# Patient Record
Sex: FEMALE | Race: WHITE | URBAN - METROPOLITAN AREA
[De-identification: names, ages, dates, MRNs, and addresses within clinical notes are randomized per-mention and may not be internally consistent; named-entity substitution may affect disease eponyms.]

---

## 2022-10-18 ENCOUNTER — OFFICE VISIT (OUTPATIENT)
Dept: URBAN - METROPOLITAN AREA CLINIC 54 | Facility: CLINIC | Age: 87
End: 2022-10-18
Payer: MEDICARE

## 2022-10-18 ENCOUNTER — WEB ENCOUNTER (OUTPATIENT)
Dept: URBAN - METROPOLITAN AREA CLINIC 54 | Facility: CLINIC | Age: 87
End: 2022-10-18

## 2022-10-18 VITALS
DIASTOLIC BLOOD PRESSURE: 79 MMHG | HEART RATE: 56 BPM | SYSTOLIC BLOOD PRESSURE: 158 MMHG | WEIGHT: 126.8 LBS | TEMPERATURE: 97.4 F | BODY MASS INDEX: 21.65 KG/M2 | HEIGHT: 64 IN

## 2022-10-18 DIAGNOSIS — R10.13 EPIGASTRIC ABDOMINAL PAIN: ICD-10-CM

## 2022-10-18 DIAGNOSIS — R11.0 NAUSEA: ICD-10-CM

## 2022-10-18 PROCEDURE — 99203 OFFICE O/P NEW LOW 30 MIN: CPT | Performed by: STUDENT IN AN ORGANIZED HEALTH CARE EDUCATION/TRAINING PROGRAM

## 2022-10-18 RX ORDER — PANTOPRAZOLE SODIUM 40 MG/1
1 TABLET TABLET, DELAYED RELEASE ORAL ONCE A DAY
Qty: 30 | Refills: 2 | OUTPATIENT
Start: 2022-10-18

## 2022-10-18 RX ORDER — MULTIVIT-MIN/IRON/FOLIC ACID/K 18-600-40
AS DIRECTED CAPSULE ORAL
Status: ACTIVE | COMMUNITY

## 2022-10-18 RX ORDER — MAGNESIUM OXIDE/MAG AA CHELATE 300 MG
1 CAPSULE WITH A MEAL CAPSULE ORAL ONCE A DAY
Status: ACTIVE | COMMUNITY

## 2022-10-18 RX ORDER — LEVOTHYROXINE SODIUM 75 UG/1
1 TABLET IN THE MORNING ON AN EMPTY STOMACH TABLET ORAL ONCE A DAY
Status: ACTIVE | COMMUNITY

## 2022-10-18 RX ORDER — ZINC GLUCONATE 50 MG
1 TABLET TABLET ORAL ONCE A DAY
Status: ACTIVE | COMMUNITY

## 2022-10-18 NOTE — HPI-TODAY'S VISIT:
Ms. Doris Russell is a 86-year-old woman with a history of hypothyroidism and melanoma status post removal who presents today to discuss stomach pain and nausea.  Patient states has been having epigastric pain for months.  She states she cannot eat because it causes her pain and nausea.  She denies any known history of GERD or of hiatal hernia.  Her last EGD was 6 to 7 years ago.  She does not know what the results were.  She does not know what the indication was.  She does not know  who this was done by.  She is not currently on any acid suppressant.  She states that symptoms are aggravated by greasy or fatty foods. she denies any aggravation from citric foods.  She does not eat spicy foods.    She is lost approximately 10 pounds in the last 3 months.  Patient denies a history of NSAID use, Helicobacter pylori, vomiting blood, vomiting, difficulty swallowing, unintentional weight loss, early satiety.   Patient denies being on anticoagulants/antiplatelets (not including aspirin 81 mg), having a pacemaker/defibrillator, cardiac arrythmia or heart failure, recent (<3 months) MI, CVA, TIA, cardiac stent placement or angioplasty, MI, cardiac valvulopathy, history of anesthesia complications including high fever with anesthesia, hemo or peritoneal dialysis, seizure d/o, need for continuous home O2, severe pulmonary disease, significantly decrease neck range of  motion, need for assistance with walking or transferring from surface-to-surface.

## 2022-10-18 NOTE — EXAM-PHYSICAL EXAM
General: Well appearing. No acute distress. HEENT: Anicteric sclerae.  Cardiovascular: Normal heart rate, no edema Respiratory: Breathing comfortably without conversational dyspnea Abdomen:  non-distended, soft, non-tender Rectal: Deferred Skin: without visible rashes on examined areas. Musculoskeletal: No limitation to neck range of motion. Ambulates without difficulty. Can stand from sitting position without assistance. Neuro: No gross focal deficits. Alert and oriented. Psych: Appropriate mood and affect.

## 2022-10-21 ENCOUNTER — CLAIMS CREATED FROM THE CLAIM WINDOW (OUTPATIENT)
Dept: URBAN - METROPOLITAN AREA SURGERY CENTER 14 | Facility: SURGERY CENTER | Age: 87
End: 2022-10-21

## 2022-10-21 ENCOUNTER — CLAIMS CREATED FROM THE CLAIM WINDOW (OUTPATIENT)
Dept: URBAN - METROPOLITAN AREA CLINIC 4 | Facility: CLINIC | Age: 87
End: 2022-10-21
Payer: MEDICARE

## 2022-10-21 ENCOUNTER — CLAIMS CREATED FROM THE CLAIM WINDOW (OUTPATIENT)
Dept: URBAN - METROPOLITAN AREA SURGERY CENTER 14 | Facility: SURGERY CENTER | Age: 87
End: 2022-10-21
Payer: MEDICARE

## 2022-10-21 DIAGNOSIS — R11.0 AM NAUSEA: ICD-10-CM

## 2022-10-21 DIAGNOSIS — R10.13 ABDOMINAL DISCOMFORT, EPIGASTRIC: ICD-10-CM

## 2022-10-21 DIAGNOSIS — K31.89 OTHER DISEASES OF STOMACH AND DUODENUM: ICD-10-CM

## 2022-10-21 DIAGNOSIS — K31.89 ACQUIRED DEFORMITY OF DUODENUM: ICD-10-CM

## 2022-10-21 PROCEDURE — G8907 PT DOC NO EVENTS ON DISCHARG: HCPCS | Performed by: STUDENT IN AN ORGANIZED HEALTH CARE EDUCATION/TRAINING PROGRAM

## 2022-10-21 PROCEDURE — 88312 SPECIAL STAINS GROUP 1: CPT | Performed by: PATHOLOGY

## 2022-10-21 PROCEDURE — 43239 EGD BIOPSY SINGLE/MULTIPLE: CPT | Performed by: STUDENT IN AN ORGANIZED HEALTH CARE EDUCATION/TRAINING PROGRAM

## 2022-10-21 PROCEDURE — 88305 TISSUE EXAM BY PATHOLOGIST: CPT | Performed by: PATHOLOGY

## 2022-10-21 RX ORDER — MAGNESIUM OXIDE/MAG AA CHELATE 300 MG
1 CAPSULE WITH A MEAL CAPSULE ORAL ONCE A DAY
Status: ACTIVE | COMMUNITY

## 2022-10-21 RX ORDER — PANTOPRAZOLE SODIUM 40 MG/1
1 TABLET TABLET, DELAYED RELEASE ORAL ONCE A DAY
Qty: 30 | Refills: 2 | Status: ACTIVE | COMMUNITY
Start: 2022-10-18

## 2022-10-21 RX ORDER — LEVOTHYROXINE SODIUM 75 UG/1
1 TABLET IN THE MORNING ON AN EMPTY STOMACH TABLET ORAL ONCE A DAY
Status: ACTIVE | COMMUNITY

## 2022-10-21 RX ORDER — MULTIVIT-MIN/IRON/FOLIC ACID/K 18-600-40
AS DIRECTED CAPSULE ORAL
Status: ACTIVE | COMMUNITY

## 2022-10-21 RX ORDER — ZINC GLUCONATE 50 MG
1 TABLET TABLET ORAL ONCE A DAY
Status: ACTIVE | COMMUNITY

## 2022-11-23 ENCOUNTER — DASHBOARD ENCOUNTERS (OUTPATIENT)
Age: 87
End: 2022-11-23

## 2022-11-23 ENCOUNTER — OFFICE VISIT (OUTPATIENT)
Dept: URBAN - METROPOLITAN AREA CLINIC 54 | Facility: CLINIC | Age: 87
End: 2022-11-23
Payer: MEDICARE

## 2022-11-23 VITALS
HEART RATE: 57 BPM | DIASTOLIC BLOOD PRESSURE: 81 MMHG | BODY MASS INDEX: 21.65 KG/M2 | TEMPERATURE: 97.8 F | WEIGHT: 126.8 LBS | SYSTOLIC BLOOD PRESSURE: 151 MMHG | HEIGHT: 64 IN

## 2022-11-23 DIAGNOSIS — R11.0 NAUSEA: ICD-10-CM

## 2022-11-23 DIAGNOSIS — R10.13 EPIGASTRIC ABDOMINAL PAIN: ICD-10-CM

## 2022-11-23 PROCEDURE — 99243 OFF/OP CNSLTJ NEW/EST LOW 30: CPT | Performed by: STUDENT IN AN ORGANIZED HEALTH CARE EDUCATION/TRAINING PROGRAM

## 2022-11-23 RX ORDER — PANTOPRAZOLE SODIUM 40 MG/1
1 TABLET TABLET, DELAYED RELEASE ORAL ONCE A DAY
Qty: 30 | Refills: 2 | Status: ACTIVE | COMMUNITY
Start: 2022-10-18

## 2022-11-23 RX ORDER — MAGNESIUM OXIDE/MAG AA CHELATE 300 MG
1 CAPSULE WITH A MEAL CAPSULE ORAL ONCE A DAY
Status: ACTIVE | COMMUNITY

## 2022-11-23 RX ORDER — LEVOTHYROXINE SODIUM 75 UG/1
1 TABLET IN THE MORNING ON AN EMPTY STOMACH TABLET ORAL ONCE A DAY
Status: ACTIVE | COMMUNITY

## 2022-11-23 RX ORDER — MULTIVIT-MIN/IRON/FOLIC ACID/K 18-600-40
AS DIRECTED CAPSULE ORAL
Status: ACTIVE | COMMUNITY

## 2022-11-23 RX ORDER — ZINC GLUCONATE 50 MG
1 TABLET TABLET ORAL ONCE A DAY
Status: ACTIVE | COMMUNITY

## 2022-11-23 NOTE — HPI-TODAY'S VISIT:
Ms. Russell returns today to discuss recent procedure.  She was previously seen in the clinic on 10/18/2022 for epigastric abdominal pain and nausea.  At that time, patient symptoms were thought to be most likely due to biliary colic although GERD and peptic ulcer disease were also in the differential.  EGD was essentially unremarkable as were gastric biopsies.  Patient had renal ultrasound on 11/22/2021 to follow-up on kidney lesion.  There were no renal abnormalities noted.  However there was noted cholelithiasis with gallstone seen in the gallbladder neck region.  Symptoms of post prandial abdominal pain and nausea persist.